# Patient Record
Sex: MALE | Race: WHITE | NOT HISPANIC OR LATINO | Employment: FULL TIME | URBAN - METROPOLITAN AREA
[De-identification: names, ages, dates, MRNs, and addresses within clinical notes are randomized per-mention and may not be internally consistent; named-entity substitution may affect disease eponyms.]

---

## 2017-01-11 ENCOUNTER — TELEPHONE (OUTPATIENT)
Dept: FAMILY MEDICINE | Facility: CLINIC | Age: 32
End: 2017-01-11

## 2017-01-11 DIAGNOSIS — E66.9 OBESITY (BMI 30-39.9): Primary | ICD-10-CM

## 2017-01-11 DIAGNOSIS — G47.33 OBSTRUCTIVE SLEEP APNEA: ICD-10-CM

## 2017-05-25 ENCOUNTER — TELEPHONE (OUTPATIENT)
Dept: FAMILY MEDICINE | Facility: CLINIC | Age: 32
End: 2017-05-25

## 2017-05-25 NOTE — TELEPHONE ENCOUNTER
I last saw him in January and ordered further sleep study (CPAP) along with labs which he has not had done. I tried reaching this morning and cannot reach. Message left. Can we please continue calling and document contact. Would like for him to have CPAP and labs and he also needs to establish care with Dr. Clemons.

## 2017-05-25 NOTE — TELEPHONE ENCOUNTER
Spoke to patient. Patient states he is busy at  The time that he will call back. Also states if we don't hear back from him to call him .

## 2017-05-31 NOTE — TELEPHONE ENCOUNTER
Spoke to pt wife and she states that he has the sleep study done a few months ago ans she is not sure why you did not receive results. States he actually went twice. They sent him home with a rx for cpap machine and they sent rx to an online cpap company. States pt has not had labs done due to him being afraid of needles. States she will try her best to get pt in to have those labs done. And she will discuss with pt to call and schedule appt to est care with shelbie.

## 2018-01-30 ENCOUNTER — CLINICAL SUPPORT (OUTPATIENT)
Dept: OCCUPATIONAL MEDICINE | Facility: CLINIC | Age: 33
End: 2018-01-30

## 2018-01-30 DIAGNOSIS — Z02.1 ENCOUNTER FOR PRE-EMPLOYMENT HEALTH SCREENING EXAMINATION: Primary | ICD-10-CM

## 2018-01-30 PROCEDURE — 86580 TB INTRADERMAL TEST: CPT | Mod: S$GLB,,,

## 2018-02-01 ENCOUNTER — CLINICAL SUPPORT (OUTPATIENT)
Dept: OCCUPATIONAL MEDICINE | Facility: CLINIC | Age: 33
End: 2018-02-01

## 2018-02-01 LAB
TB INDURATION - 48 HR READ: 0 MM
TB INDURATION - 72 HR READ: NORMAL MM
TB SKIN TEST - 48 HR READ: NEGATIVE
TB SKIN TEST - 72 HR READ: NORMAL

## 2018-11-26 ENCOUNTER — OCCUPATIONAL HEALTH (OUTPATIENT)
Dept: URGENT CARE | Facility: CLINIC | Age: 33
End: 2018-11-26

## 2018-11-26 DIAGNOSIS — Z02.83 ENCOUNTER FOR DRUG SCREENING: Primary | ICD-10-CM

## 2018-11-26 PROCEDURE — 80305 DRUG TEST PRSMV DIR OPT OBS: CPT | Mod: S$GLB,,, | Performed by: FAMILY MEDICINE

## 2019-07-30 ENCOUNTER — NURSE TRIAGE (OUTPATIENT)
Dept: ADMINISTRATIVE | Facility: CLINIC | Age: 34
End: 2019-07-30

## 2019-07-30 NOTE — TELEPHONE ENCOUNTER
Reason for Disposition   [1] Fever AND [2] no signs of serious infection or localizing symptoms (all other triage questions negative)    Protocols used: FEVER-A-AH    Pt states he started running a fever last night and it went away but returned today, he states it was 102.6 at one point and he has been taking tylenol and ibuprofen and it has dropped, he wanted to know when he should seek medical attention, I advised him of protocol recommendations, caller understands, advised him to call back with any needs or concerns, caller agreed

## 2019-07-31 ENCOUNTER — HOSPITAL ENCOUNTER (OUTPATIENT)
Dept: RADIOLOGY | Facility: HOSPITAL | Age: 34
Discharge: HOME | End: 2019-07-31
Attending: NURSE PRACTITIONER
Payer: COMMERCIAL

## 2019-07-31 ENCOUNTER — OFFICE VISIT (OUTPATIENT)
Dept: FAMILY MEDICINE | Facility: CLINIC | Age: 34
End: 2019-07-31
Payer: COMMERCIAL

## 2019-07-31 VITALS
BODY MASS INDEX: 38.83 KG/M2 | HEIGHT: 67 IN | SYSTOLIC BLOOD PRESSURE: 134 MMHG | DIASTOLIC BLOOD PRESSURE: 70 MMHG | HEART RATE: 121 BPM | OXYGEN SATURATION: 98 % | TEMPERATURE: 101 F | WEIGHT: 247.38 LBS

## 2019-07-31 DIAGNOSIS — R00.0 TACHYCARDIA WITH HEART RATE 121-140 BEATS PER MINUTE: ICD-10-CM

## 2019-07-31 DIAGNOSIS — R31.29 OTHER MICROSCOPIC HEMATURIA: ICD-10-CM

## 2019-07-31 DIAGNOSIS — J35.1 ENLARGED TONSILS: ICD-10-CM

## 2019-07-31 DIAGNOSIS — R50.9 FEVER, UNSPECIFIED FEVER CAUSE: Primary | ICD-10-CM

## 2019-07-31 DIAGNOSIS — R61 DIAPHORESIS: ICD-10-CM

## 2019-07-31 DIAGNOSIS — R50.9 CHILLS WITH FEVER: ICD-10-CM

## 2019-07-31 DIAGNOSIS — R50.9 FEVER, UNSPECIFIED FEVER CAUSE: ICD-10-CM

## 2019-07-31 LAB
BILIRUB SERPL-MCNC: ABNORMAL MG/DL
BILIRUB UR QL STRIP: NEGATIVE
BLOOD URINE, POC: ABNORMAL
CLARITY UR: CLEAR
COLOR UR: YELLOW
COLOR, POC UA: YELLOW
CTP QC/QA: YES
GLUCOSE UR QL STRIP: NEGATIVE
GLUCOSE UR QL STRIP: NORMAL
HGB UR QL STRIP: ABNORMAL
INFLUENZA A, MOLECULAR: NEGATIVE
INFLUENZA B, MOLECULAR: NEGATIVE
KETONES UR QL STRIP: ABNORMAL
KETONES UR QL STRIP: NEGATIVE
LEUKOCYTE ESTERASE UR QL STRIP: NEGATIVE
LEUKOCYTE ESTERASE URINE, POC: ABNORMAL
NITRITE UR QL STRIP: NEGATIVE
NITRITE, POC UA: ABNORMAL
PH UR STRIP: 6 [PH] (ref 5–8)
PH, POC UA: 5
PROT UR QL STRIP: NEGATIVE
PROTEIN, POC: ABNORMAL
S PYO RRNA THROAT QL PROBE: NEGATIVE
SP GR UR STRIP: 1.01 (ref 1–1.03)
SPECIFIC GRAVITY, POC UA: 1
SPECIMEN SOURCE: NORMAL
URN SPEC COLLECT METH UR: ABNORMAL
UROBILINOGEN, POC UA: NORMAL

## 2019-07-31 PROCEDURE — 81001 URINALYSIS AUTO W/SCOPE: CPT | Mod: S$GLB,,, | Performed by: NURSE PRACTITIONER

## 2019-07-31 PROCEDURE — 81003 URINALYSIS AUTO W/O SCOPE: CPT | Mod: PO

## 2019-07-31 PROCEDURE — 87070 CULTURE OTHR SPECIMN AEROBIC: CPT

## 2019-07-31 PROCEDURE — 99214 PR OFFICE/OUTPT VISIT, EST, LEVL IV, 30-39 MIN: ICD-10-PCS | Mod: 25,S$GLB,, | Performed by: NURSE PRACTITIONER

## 2019-07-31 PROCEDURE — 99999 PR PBB SHADOW E&M-EST. PATIENT-LVL IV: ICD-10-PCS | Mod: PBBFAC,,, | Performed by: NURSE PRACTITIONER

## 2019-07-31 PROCEDURE — 3008F BODY MASS INDEX DOCD: CPT | Mod: CPTII,S$GLB,, | Performed by: NURSE PRACTITIONER

## 2019-07-31 PROCEDURE — 81001 POCT URINALYSIS, DIPSTICK OR TABLET REAGENT, AUTOMATED, WITH MICROSCOP: ICD-10-PCS | Mod: S$GLB,,, | Performed by: NURSE PRACTITIONER

## 2019-07-31 PROCEDURE — 71046 X-RAY EXAM CHEST 2 VIEWS: CPT | Mod: TC,FY,PO

## 2019-07-31 PROCEDURE — 3008F PR BODY MASS INDEX (BMI) DOCUMENTED: ICD-10-PCS | Mod: CPTII,S$GLB,, | Performed by: NURSE PRACTITIONER

## 2019-07-31 PROCEDURE — 71046 X-RAY EXAM CHEST 2 VIEWS: CPT | Mod: 26,,, | Performed by: RADIOLOGY

## 2019-07-31 PROCEDURE — 87502 INFLUENZA DNA AMP PROBE: CPT | Mod: PO

## 2019-07-31 PROCEDURE — 99999 PR PBB SHADOW E&M-EST. PATIENT-LVL IV: CPT | Mod: PBBFAC,,, | Performed by: NURSE PRACTITIONER

## 2019-07-31 PROCEDURE — 87880 STREP A ASSAY W/OPTIC: CPT | Mod: QW,S$GLB,, | Performed by: NURSE PRACTITIONER

## 2019-07-31 PROCEDURE — 71046 XR CHEST PA AND LATERAL: ICD-10-PCS | Mod: 26,,, | Performed by: RADIOLOGY

## 2019-07-31 PROCEDURE — 99214 OFFICE O/P EST MOD 30 MIN: CPT | Mod: 25,S$GLB,, | Performed by: NURSE PRACTITIONER

## 2019-07-31 PROCEDURE — 87880 POCT RAPID STREP A: ICD-10-PCS | Mod: QW,S$GLB,, | Performed by: NURSE PRACTITIONER

## 2019-07-31 RX ORDER — ACETAMINOPHEN 500 MG
1000 TABLET ORAL ONCE
Status: COMPLETED | OUTPATIENT
Start: 2019-07-31 | End: 2019-07-31

## 2019-07-31 RX ADMIN — Medication 1000 MG: at 04:07

## 2019-07-31 NOTE — PROGRESS NOTES
Subjective:       Patient ID: Jony Reddy is a 33 y.o. male.    Chief Complaint: Fever  He was last seen in primary care by me on 12/29/2016.  HPI   He states started having temperature 2 days ago, Temp was 99 at the beginning. Took 3 ibuprofen and on next morning felt bad and took 3 more ibuprofen but did not take temp. Tuesday at 1:30 he went home and temp was 102.5 and upwards of 103 and last night 103.5  His last dose of medication was tylenol 2 tabs at 11:30.  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  Vitals:    07/31/19 1621   BP:    Pulse:    Temp: (!) 100.9 °F (38.3 °C)     BP Readings from Last 3 Encounters:   07/31/19 134/70   04/18/18 (!) 144/78   12/29/16 (!) 156/80     Pulse Readings from Last 3 Encounters:   07/31/19 (!) 121   04/18/18 96   12/29/16 92       Review of Systems   Constitutional: Positive for chills, diaphoresis and fever.   HENT: Negative for sore throat.         Sinus stopped up   Skin: Negative for rash.       Objective:      Physical Exam   Constitutional: He is oriented to person, place, and time. He appears well-developed and well-nourished. He is cooperative. He appears ill.   HENT:   Head: Normocephalic and atraumatic.   Right Ear: Hearing, tympanic membrane, external ear and ear canal normal.   Left Ear: Hearing, tympanic membrane, external ear and ear canal normal.   Nose: Nose normal.   Mouth/Throat: Uvula is midline, oropharynx is clear and moist and mucous membranes are normal. Tonsils are 3+ on the right. Tonsils are 3+ on the left. No tonsillar exudate.   Eyes: Lids are normal.   Neck: Trachea normal, normal range of motion, full passive range of motion without pain and phonation normal. Neck supple.   Cardiovascular: Regular rhythm. Tachycardia present.    Pulmonary/Chest: Effort normal and breath sounds normal.   Abdominal: Soft. Normal appearance and bowel sounds are normal. He exhibits no ascites. There is no splenomegaly. There is no tenderness.   Obese abdomen   Musculoskeletal: Normal range of motion.   Lymphadenopathy:        Head (right side): No submental, no submandibular, no tonsillar, no preauricular, no posterior auricular and no occipital adenopathy present.        Head (left side): No submental, no submandibular, no tonsillar, no preauricular, no posterior auricular and no occipital adenopathy present.     He has no cervical adenopathy.   Neurological: He is alert and oriented to person, place, and time. He has normal strength. No cranial nerve deficit. He displays a negative Romberg sign. He displays no seizure activity.   Skin: Skin is warm and intact. No rash noted. He is diaphoretic.   Psychiatric: His speech is normal and behavior is normal. Judgment and thought content normal. His mood appears anxious. Cognition and memory are normal.   Nursing note and vitals reviewed.      Assessment & Plan:       Fever, unspecified fever cause  -     POCT urinalysis, dipstick or tablet reag  -     POCT Rapid Strep A  -     X-Ray Chest PA And Lateral; Future; Expected date: 07/31/2019  -     CBC auto differential; Future; Expected date: 07/31/2019  -     Comprehensive metabolic panel; Future; Expected date: 07/31/2019  -     Throat culture  -     POCT Influenza A/B Molecular  -     URINALYSIS  -     Influenza A & B by Molecular  -     acetaminophen tablet 1,000 mg    Chills with fever  -     POCT urinalysis, dipstick or tablet reag  -     POCT Rapid Strep A  -     X-Ray Chest PA And Lateral; Future; Expected date: 07/31/2019  -     CBC auto differential; Future; Expected date: 07/31/2019  -     Comprehensive metabolic panel; Future; Expected date: 07/31/2019  -     Throat culture  -     POCT Influenza A/B Molecular  -     URINALYSIS  -     Influenza A & B by  Molecular    Tachycardia with heart rate 121-140 beats per minute  -     CBC auto differential; Future; Expected date: 07/31/2019  -     Comprehensive metabolic panel; Future; Expected date: 07/31/2019  -     POCT Influenza A/B Molecular  -     URINALYSIS  -     Influenza A & B by Molecular    Enlarged tonsils  -     Throat culture    Diaphoresis  -     X-Ray Chest PA And Lateral; Future; Expected date: 07/31/2019  -     Comprehensive metabolic panel; Future; Expected date: 07/31/2019  -     POCT Influenza A/B Molecular    Other microscopic hematuria    Rotate motrin and tylenol every 4 hours  TO emergency if temp over 103, stiff neck, rash, worsening symptoms  I have discussed this case in detail with one of the collaborating physicians in the office and differential of viral cause cannot be ruled out. I will follow him in clinic again on Friday. I spent more than 1 hour face-to-face with more than 50% with coordination of care and explaining in great detail need to go to ED if temperature greater than 103 or worsening symptoms.        Follow up in about 2 days (around 8/2/2019), or if symptoms worsen or fail to improve.

## 2019-07-31 NOTE — PATIENT INSTRUCTIONS
Rotate motrin and tylenol every 4 hours  TO emergency if temp over 103, stiff neck, rash, worsening symptoms

## 2019-07-31 NOTE — LETTER
July 31, 2019      [facility]  [address] [facility] [address] [place] [address]  Phone: [phone]  Fax: [phone]       Patient: Jony Reddy   YOB: 1985  Date of Visit: 07/31/2019    To Whom It May Concern:    Sheron Reddy  was at [facility] on 07/31/2019. He may return to work/school on 08/05/2019 with no restrictions. If you have any questions or concerns, or if I can be of further assistance, please do not hesitate to contact me.    Sincerely,    Yaa Prince DNP, APRN

## 2019-08-01 ENCOUNTER — TELEPHONE (OUTPATIENT)
Dept: FAMILY MEDICINE | Facility: CLINIC | Age: 34
End: 2019-08-01

## 2019-08-01 NOTE — TELEPHONE ENCOUNTER
Spoke with patient via telephone. States temp spiked to 103 last night but went down immediately to 99 after dosage of tylenol. Sore throat today but no rash or other changes. I have advised him to keep appointment tomorrow and to go to ED tonight if symptoms worsen.

## 2019-08-02 ENCOUNTER — OFFICE VISIT (OUTPATIENT)
Dept: FAMILY MEDICINE | Facility: CLINIC | Age: 34
End: 2019-08-02
Payer: COMMERCIAL

## 2019-08-02 VITALS
OXYGEN SATURATION: 98 % | BODY MASS INDEX: 38.47 KG/M2 | DIASTOLIC BLOOD PRESSURE: 86 MMHG | TEMPERATURE: 98 F | WEIGHT: 245.13 LBS | SYSTOLIC BLOOD PRESSURE: 122 MMHG | HEIGHT: 67 IN | HEART RATE: 81 BPM

## 2019-08-02 DIAGNOSIS — J02.8 BACTERIAL PHARYNGITIS: Primary | ICD-10-CM

## 2019-08-02 DIAGNOSIS — R03.0 ELEVATED BLOOD PRESSURE READING WITHOUT DIAGNOSIS OF HYPERTENSION: ICD-10-CM

## 2019-08-02 DIAGNOSIS — B96.89 BACTERIAL PHARYNGITIS: Primary | ICD-10-CM

## 2019-08-02 PROCEDURE — 3008F PR BODY MASS INDEX (BMI) DOCUMENTED: ICD-10-PCS | Mod: CPTII,S$GLB,, | Performed by: NURSE PRACTITIONER

## 2019-08-02 PROCEDURE — 3008F BODY MASS INDEX DOCD: CPT | Mod: CPTII,S$GLB,, | Performed by: NURSE PRACTITIONER

## 2019-08-02 PROCEDURE — 99214 PR OFFICE/OUTPT VISIT, EST, LEVL IV, 30-39 MIN: ICD-10-PCS | Mod: S$GLB,,, | Performed by: NURSE PRACTITIONER

## 2019-08-02 PROCEDURE — 99999 PR PBB SHADOW E&M-EST. PATIENT-LVL IV: ICD-10-PCS | Mod: PBBFAC,,, | Performed by: NURSE PRACTITIONER

## 2019-08-02 PROCEDURE — 99214 OFFICE O/P EST MOD 30 MIN: CPT | Mod: S$GLB,,, | Performed by: NURSE PRACTITIONER

## 2019-08-02 PROCEDURE — 99999 PR PBB SHADOW E&M-EST. PATIENT-LVL IV: CPT | Mod: PBBFAC,,, | Performed by: NURSE PRACTITIONER

## 2019-08-02 RX ORDER — ACETAMINOPHEN 325 MG/1
325 TABLET ORAL EVERY 6 HOURS PRN
COMMUNITY
End: 2023-11-14 | Stop reason: CLARIF

## 2019-08-02 RX ORDER — IBUPROFEN 200 MG
600 TABLET ORAL 2 TIMES DAILY PRN
Status: ON HOLD | COMMUNITY
End: 2023-11-15 | Stop reason: HOSPADM

## 2019-08-02 RX ORDER — AZITHROMYCIN 250 MG/1
TABLET, FILM COATED ORAL
Qty: 6 TABLET | Refills: 0 | Status: SHIPPED | OUTPATIENT
Start: 2019-08-02 | End: 2019-08-07

## 2019-08-02 NOTE — PATIENT INSTRUCTIONS

## 2019-08-02 NOTE — PROGRESS NOTES
Subjective:       Patient ID: Jony Reddy is a 33 y.o. male.    Chief Complaint: Follow-up (1 month)  I last saw him on 07/31/2019.  HPI   He is here today to follow up with persistent fever. His fever has subsided since yesterday but he does complain of sore throat today.  Vitals:    08/02/19 0856   BP: 122/86   Pulse: 81   Temp: 98.1 °F (36.7 °C)     BP Readings from Last 3 Encounters:   08/02/19 122/86   07/31/19 134/70   04/18/18 (!) 144/78     Pulse Readings from Last 3 Encounters:   08/02/19 81   07/31/19 (!) 121   04/18/18 96     Review of Systems    Took ibuprofen 600 mg at 4am today. Last temp yesterday was 99.  Objective:      Physical Exam   Constitutional: He is oriented to person, place, and time. Vital signs are normal. He appears well-developed and well-nourished. He is cooperative. He appears ill.   HENT:   Head: Normocephalic and atraumatic.   Right Ear: Hearing, tympanic membrane, external ear and ear canal normal.   Left Ear: Hearing, tympanic membrane, external ear and ear canal normal.   Nose: Nose normal.   Mouth/Throat: Uvula is midline, oropharynx is clear and moist and mucous membranes are normal. Tonsils are 2+ on the right. Tonsils are 2+ on the left.       Beefy red tonsils and uvula, complains of severely sore throat.   Eyes: Lids are normal.   Neck: Trachea normal, normal range of motion, full passive range of motion without pain and phonation normal. Neck supple.   Cardiovascular: Normal rate, regular rhythm and normal heart sounds.   Pulmonary/Chest: Effort normal and breath sounds normal.   Abdominal: Soft. Bowel sounds are normal.   Musculoskeletal: Normal range of motion.   Lymphadenopathy:        Head (right side): No submental, no submandibular, no tonsillar, no preauricular, no posterior auricular and no occipital adenopathy present.        Head (left side): No submental, no submandibular, no tonsillar, no preauricular, no posterior auricular and no occipital  adenopathy present.     He has no cervical adenopathy.   Neurological: He is alert and oriented to person, place, and time.   Skin: Skin is warm, dry and intact. No rash noted.   Psychiatric: He has a normal mood and affect. His speech is normal and behavior is normal. Judgment and thought content normal. Cognition and memory are normal.   Nursing note and vitals reviewed.      Assessment & Plan:       Bacterial pharyngitis  -     azithromycin (Z-RACHNA) 250 MG tablet; Take 2 tablets by mouth on day 1 then take 1 tablet by mouth on days 2-5  Dispense: 6 tablet; Refill: 0    Elevated blood pressure reading without diagnosis of hypertension  -     Lipid panel; Future; Expected date: 08/02/2019  -     Comprehensive metabolic panel; Future; Expected date: 08/02/2019  -     CBC auto differential; Future; Expected date: 08/02/2019  -     TSH; Future; Expected date: 08/02/2019      Medication List with Changes/Refills   New Medications    AZITHROMYCIN (Z-RACHNA) 250 MG TABLET    Take 2 tablets by mouth on day 1 then take 1 tablet by mouth on days 2-5   Current Medications    ACETAMINOPHEN (TYLENOL) 325 MG TABLET    Take 325 mg by mouth every 6 (six) hours as needed for Pain.    IBUPROFEN (ADVIL,MOTRIN) 200 MG TABLET    Take 200 mg by mouth every 6 (six) hours as needed for Pain.         Follow up if symptoms worsen or fail to improve.

## 2019-08-03 LAB — BACTERIA THROAT CULT: NORMAL

## 2019-10-18 ENCOUNTER — PATIENT OUTREACH (OUTPATIENT)
Dept: ADMINISTRATIVE | Facility: HOSPITAL | Age: 34
End: 2019-10-18

## 2019-10-18 NOTE — PROGRESS NOTES
Health Maintenance Due   Topic Date Due    Lipid Panel  1984    TETANUS VACCINE  12/30/2002    Influenza Vaccine (1) 09/01/2019     Chart review completed 10/18/2019

## 2019-11-09 ENCOUNTER — TELEPHONE (OUTPATIENT)
Dept: FAMILY MEDICINE | Facility: CLINIC | Age: 34
End: 2019-11-09

## 2021-05-04 ENCOUNTER — PATIENT MESSAGE (OUTPATIENT)
Dept: RESEARCH | Facility: HOSPITAL | Age: 36
End: 2021-05-04

## 2023-11-14 PROBLEM — R07.9 CHEST PAIN: Status: ACTIVE | Noted: 2023-11-14

## 2023-11-14 PROBLEM — I21.4 NSTEMI (NON-ST ELEVATED MYOCARDIAL INFARCTION): Status: ACTIVE | Noted: 2023-11-14

## 2023-11-16 ENCOUNTER — NURSE TRIAGE (OUTPATIENT)
Dept: ADMINISTRATIVE | Facility: CLINIC | Age: 38
End: 2023-11-16
Payer: COMMERCIAL

## 2023-11-16 ENCOUNTER — TELEPHONE (OUTPATIENT)
Dept: CARDIOLOGY | Facility: CLINIC | Age: 38
End: 2023-11-16
Payer: COMMERCIAL

## 2023-11-16 NOTE — TELEPHONE ENCOUNTER
Spoke with pt who states that since he took his medication at 11 am he has been having a rash/hives all over his body. Pt reports no trouble breathing or swallowing. Advised pt to go to urgent care or ED now and not take the medication until advised by a MD. Joce SANCHEZ

## 2023-11-16 NOTE — TELEPHONE ENCOUNTER
----- Message from Krista Jade sent at 11/16/2023  2:27 PM CST -----  Regarding: Allergic reaction  Type:  Needs Medical Advice    Who Called: Pt    Symptoms (please be specific): Rash on neck/ head/ hair ( all over body)     How long has patient had these symptoms:  few hrs    Pharmacy name and phone #:    [id number] - [facility] [address] [address] AT [facility] [address] [facility] [address]  [address] [address] [address]  Phone: [phone] Fax: [phone]      Would the patient rather a call back or a response via [facility]? Call back    Best Call Back Number: [phone]    Additional Information: Pt sts he was in the hosp and was give some meds and think he is having a allergic reaction clopidogreL (PLAVIX) 75 mg tablet, atorvastatin (LIPITOR) 40 MG tablet and aspirin (ECOTRIN) 81 MG EC tablet. Please advise -------------------thank you

## 2023-11-16 NOTE — TELEPHONE ENCOUNTER
Spoke with pt who reports that he was discharged from hospital on ASA, plavix, and atorvastatin. States that he took meds at 10 AM and noticed throughout the day he began to develop bumps all over body, that are itchy. Pt advised to be seen in ED. Verbalized understanding.      Reason for Disposition   Widespread hives, itching, or facial swelling and onset < 2 hours of exposure to high-risk allergen (e.g., 1st dose of antibiotic, nuts, sting)    Additional Information   Negative: Difficulty breathing or wheezing now   Negative: Rapid onset of swollen tongue   Negative: Rapid onset of hoarseness or cough   Negative: Very weak (can't stand)   Negative: Difficult to awaken or acting confused (e.g., disoriented, slurred speech)   Negative: Life-threatening reaction (anaphylaxis) in the past to similar substance (e.g., food, insect bite/sting, chemical, etc.) and < 2 hours since exposure   Negative: Sounds like a life-threatening emergency to the triager   Negative: Swollen tongue    Protocols used: Hives-A-OH

## 2023-11-29 ENCOUNTER — OFFICE VISIT (OUTPATIENT)
Dept: CARDIOLOGY | Facility: CLINIC | Age: 38
End: 2023-11-29
Payer: COMMERCIAL

## 2023-11-29 ENCOUNTER — PATIENT MESSAGE (OUTPATIENT)
Dept: CARDIOLOGY | Facility: CLINIC | Age: 38
End: 2023-11-29

## 2023-11-29 VITALS
WEIGHT: 219.13 LBS | HEIGHT: 68 IN | BODY MASS INDEX: 33.21 KG/M2 | SYSTOLIC BLOOD PRESSURE: 132 MMHG | DIASTOLIC BLOOD PRESSURE: 83 MMHG | HEART RATE: 86 BPM

## 2023-11-29 DIAGNOSIS — I21.4 NSTEMI (NON-ST ELEVATED MYOCARDIAL INFARCTION): Primary | ICD-10-CM

## 2023-11-29 DIAGNOSIS — R03.0 ELEVATED BLOOD PRESSURE READING WITHOUT DIAGNOSIS OF HYPERTENSION: ICD-10-CM

## 2023-11-29 PROCEDURE — 3044F PR MOST RECENT HEMOGLOBIN A1C LEVEL <7.0%: ICD-10-PCS | Mod: CPTII,S$GLB,,

## 2023-11-29 PROCEDURE — 99999 PR PBB SHADOW E&M-EST. PATIENT-LVL III: ICD-10-PCS | Mod: PBBFAC,,,

## 2023-11-29 PROCEDURE — 3008F PR BODY MASS INDEX (BMI) DOCUMENTED: ICD-10-PCS | Mod: CPTII,S$GLB,,

## 2023-11-29 PROCEDURE — 99999 PR PBB SHADOW E&M-EST. PATIENT-LVL III: CPT | Mod: PBBFAC,,,

## 2023-11-29 PROCEDURE — 1111F PR DISCHARGE MEDS RECONCILED W/ CURRENT OUTPATIENT MED LIST: ICD-10-PCS | Mod: CPTII,S$GLB,,

## 2023-11-29 PROCEDURE — 1159F PR MEDICATION LIST DOCUMENTED IN MEDICAL RECORD: ICD-10-PCS | Mod: CPTII,S$GLB,,

## 2023-11-29 PROCEDURE — 3079F DIAST BP 80-89 MM HG: CPT | Mod: CPTII,S$GLB,,

## 2023-11-29 PROCEDURE — 1111F DSCHRG MED/CURRENT MED MERGE: CPT | Mod: CPTII,S$GLB,,

## 2023-11-29 PROCEDURE — 3044F HG A1C LEVEL LT 7.0%: CPT | Mod: CPTII,S$GLB,,

## 2023-11-29 PROCEDURE — 3075F PR MOST RECENT SYSTOLIC BLOOD PRESS GE 130-139MM HG: ICD-10-PCS | Mod: CPTII,S$GLB,,

## 2023-11-29 PROCEDURE — 3075F SYST BP GE 130 - 139MM HG: CPT | Mod: CPTII,S$GLB,,

## 2023-11-29 PROCEDURE — 1159F MED LIST DOCD IN RCRD: CPT | Mod: CPTII,S$GLB,,

## 2023-11-29 PROCEDURE — 99214 OFFICE O/P EST MOD 30 MIN: CPT | Mod: S$GLB,,,

## 2023-11-29 PROCEDURE — 3079F PR MOST RECENT DIASTOLIC BLOOD PRESSURE 80-89 MM HG: ICD-10-PCS | Mod: CPTII,S$GLB,,

## 2023-11-29 PROCEDURE — 99214 PR OFFICE/OUTPT VISIT, EST, LEVL IV, 30-39 MIN: ICD-10-PCS | Mod: S$GLB,,,

## 2023-11-29 PROCEDURE — 3008F BODY MASS INDEX DOCD: CPT | Mod: CPTII,S$GLB,,

## 2023-11-29 NOTE — PROGRESS NOTES
Subjective:    Patient ID:  Jony Reddy is a 37 y.o. male patient here for evaluation No chief complaint on file.    History of Present Illness:     Mr. Reddy is a 37 year old M who will establish care with Dr. Pearson here today for a hospital follow up. Admitted with chest pain after viral infection and found to have elevated troponin. LHC revealed a distal circ occlusion too small for PCI. EF preserved. Does not have anymore chest pains. Had an allergic reaction to lipitor so has discontinued. Doing well but is concerned that he will have another heart attack.           Most Recent Echocardiogram Results  Results for orders placed during the hospital encounter of 11/14/23    Echo Saline Bubble? No    Interpretation Summary    Left Ventricle: The left ventricle is normal in size. Normal wall thickness. Normal wall motion. There is normal systolic function. Ejection fraction by visual approximation is 60%. There is normal diastolic function.    Right Ventricle: Normal right ventricular cavity size. Wall thickness is normal. Right ventricle wall motion  is normal. Systolic function is normal.    Aortic Valve: The aortic valve is a trileaflet valve.    IVC/SVC: Normal venous pressure at 3 mmHg.      Most Recent Nuclear Stress Test Results  No results found for this or any previous visit.      Most Recent Cardiac PET Stress Test Results  No results found for this or any previous visit.      Most Recent Cardiovascular Angiogram results  Results for orders placed during the hospital encounter of 11/14/23    Cardiac catheterization    Conclusion    Single-vessel coronary artery disease as described in the text, with a total occlusion of a very distal branch of the circumflex, no PCI was done    The left ventricular systolic function was normal.    The left ventricular end diastolic pressure was elevated.    The procedure log was documented by Documenter: Trey Lima RT and verified by Rob OWEN  MD Tin.    Date: 11/14/2023  Time: 1:00 PM    Intervention:  Unable to advance wire into the did very distal portion of the distal left circumflex.  Thus, no PCI attempted      Other Most Recent Cardiology Results  Results for orders placed during the hospital encounter of 11/16/23    CARDIAC MONITORING STRIPS      REVIEW OF SYSTEMS: As noted in HPI   CARDIOVASCULAR: No recent chest pain, palpitations, arm/neck/jaw pain, or edema.  RESPIRATORY: No recent fever, cough, SOB.  : No blood in the urine  GI: No reflux, nausea, vomiting, or blood in stool.   MUSCULOSKELETAL: No falls.   NEURO: No headaches, syncope, or dizziness.  EYES: No sudden changes in vision.     Past Medical History:   Diagnosis Date    White coat syndrome with high blood pressure but without hypertension      Past Surgical History:   Procedure Laterality Date    CORONARY ANGIOGRAPHY  11/14/2023    Procedure: Coronary angiogram study;  Surgeon: Rob Castle MD;  Location: [facility] CATH;  Service: Cardiology;;    LEFT HEART CATHETERIZATION  11/14/2023    Procedure: Left heart cath ER EXAM 4;  Surgeon: Rob Castle MD;  Location: [facility] CATH;  Service: Cardiology;;     Social History     Tobacco Use    Smoking status: Former    Smokeless tobacco: Never         Objective      Vitals:    11/29/23 1413   BP: 132/83   Pulse: 86       LAST EKG  Results for orders placed or performed in visit on 11/16/23   EKG 12-lead    Collection Time: 11/16/23  3:26 PM    Narrative    Test Reason : T78.40XA    Vent. Rate : 090 BPM     Atrial Rate : 090 BPM     P-R Int : 142 ms          QRS Dur : 084 ms      QT Int : 342 ms       P-R-T Axes : 064 -05 -25 degrees     QTc Int : 418 ms    Normal sinus rhythm  Inferior infarct (cited on or before 14-NOV-2023)  T wave abnormality, consider lateral ischemia  Abnormal ECG  When compared with ECG of 14-NOV-2023 05:47,  ST no longer elevated in Anterior leads  T wave inversion now evident in Inferior  "leads  T wave inversion now evident in Anterior-lateral leads  Confirmed by Lili AGUILAR, Danny MICHAUD ([id number]) on 11/17/2023 2:46:16 PM    Referred By: [email or web address]   SELF           Confirmed By:Danny Pearson MD     LIPIDS - LAST 2   Lab Results   Component Value Date    CHOL 168 11/15/2023    HDL 30 (L) 11/15/2023    LDLCALC 108.0 11/15/2023    TRIG 150 11/15/2023    CHOLHDL 17.9 (L) 11/15/2023     CARDIAC PROFILE - LAST 2  Lab Results   Component Value Date    TROPONINI 2.210 (HH) 11/14/2023    TROPONINI 1.910 (HH) 11/14/2023      CBC - LAST 2  Lab Results   Component Value Date    WBC 9.14 11/14/2023    WBC 7.42 07/31/2019    RBC 5.40 11/14/2023    RBC 5.60 07/31/2019    HGB 15.7 11/14/2023    HGB 16.0 07/31/2019    HCT 47.2 11/14/2023    HCT 46.5 07/31/2019     11/14/2023     07/31/2019     No results found for: "LABPT", "INR", "APTT"  CHEMISTRY - LAST 2  Lab Results   Component Value Date     11/14/2023     10/03/2022    K 3.6 11/14/2023    K 4.0 10/03/2022     11/14/2023     07/31/2019    CO2 21 (L) 11/14/2023    CO2 26 10/03/2022    ANIONGAP 11 11/14/2023    ANIONGAP 11 10/03/2022    BUN 13 11/14/2023    BUN 12 10/03/2022    CREATININE 0.81 11/14/2023    CREATININE 0.91 10/03/2022     (H) 11/14/2023    GLU 97 07/31/2019    CALCIUM 8.8 11/14/2023    CALCIUM 10.5 (H) 10/03/2022    MG 2.1 11/14/2023    ALBUMIN 4.2 11/14/2023    ALBUMIN 4.7 10/03/2022    PROT 7.5 11/14/2023    PROT 8.0 (H) 10/03/2022    ALKPHOS 61 11/14/2023    ALKPHOS 52 10/03/2022    ALT 36 11/14/2023    ALT 74 (H) 10/03/2022    AST 37 11/14/2023    AST 48 (H) 10/03/2022    BILITOT 0.5 11/14/2023    BILITOT 0.5 10/03/2022      ENDOCRINE - LAST 2  Lab Results   Component Value Date    HGBA1C 5.0 11/14/2023    HGBA1C 5.1 11/16/2022    TSH 1.620 11/14/2023    TSH 1.170 04/18/2018        PHYSICAL EXAM  CONSTITUTIONAL: Well built, well nourished in no apparent distress  NECK: no carotid bruit, no JVD  LUNGS: " CTA  CHEST WALL: no tenderness  HEART: regular rate and rhythm, S1, S2 normal, no murmur, click, rub or gallop   ABDOMEN: soft, non-tender; bowel sounds normal; no masses,  no organomegaly  EXTREMITIES: Extremities normal, no edema, no calf tenderness noted  NEURO: AAO X 3    I HAVE REVIEWED :    The vital signs, most recent cardiac testing, and most recent pertinent non-cardiology provider notes.    Current Outpatient Medications   Medication Instructions    aspirin (ECOTRIN) 81 mg, Oral, Daily    atorvastatin (LIPITOR) 40 mg, Oral, Daily    azithromycin (Z-RACHNA) 500 mg, Oral, Once, On day 1    clopidogreL (PLAVIX) 75 mg, Oral, Daily    EPINEPHrine (EPIPEN) 0.3 mg, Intramuscular, As needed (PRN)    famotidine (PEPCID) 20 mg, Oral, 2 times daily    sildenafiL (VIAGRA) 25 mg, Oral, Daily PRN      Assessment & Plan     1. NSTEMI (non-ST elevated myocardial infarction)  Very distal circ not amendable to PCI   No more angina   Continue risk factor modification   Allergy to statin -- Try bempedoic acid   Continue asa plavix. D/c plavix after 3-6 months if side effects persist.     2. Elevated blood pressure reading without diagnosis of hypertension  BP ok today, continue               Follow up in about 6 months (around 5/29/2024).     Naomi Peters, PA-C [facility]   Office: [phone]

## 2023-12-15 ENCOUNTER — PATIENT MESSAGE (OUTPATIENT)
Dept: CARDIOLOGY | Facility: CLINIC | Age: 38
End: 2023-12-15
Payer: COMMERCIAL

## 2024-01-18 ENCOUNTER — PATIENT MESSAGE (OUTPATIENT)
Dept: CARDIOLOGY | Facility: CLINIC | Age: 39
End: 2024-01-18
Payer: COMMERCIAL

## 2024-01-18 DIAGNOSIS — I21.4 NSTEMI (NON-ST ELEVATED MYOCARDIAL INFARCTION): Primary | ICD-10-CM

## 2024-01-18 RX ORDER — CLOPIDOGREL BISULFATE 75 MG/1
75 TABLET ORAL DAILY
Qty: 90 TABLET | Refills: 3 | Status: SHIPPED | OUTPATIENT
Start: 2024-01-18 | End: 2024-05-29

## 2024-02-19 ENCOUNTER — PATIENT MESSAGE (OUTPATIENT)
Dept: CARDIOLOGY | Facility: CLINIC | Age: 39
End: 2024-02-19
Payer: COMMERCIAL

## 2024-02-19 PROBLEM — I21.4 NSTEMI (NON-ST ELEVATED MYOCARDIAL INFARCTION): Status: RESOLVED | Noted: 2023-11-14 | Resolved: 2024-02-19

## 2024-05-29 ENCOUNTER — OFFICE VISIT (OUTPATIENT)
Dept: CARDIOLOGY | Facility: CLINIC | Age: 39
End: 2024-05-29
Payer: COMMERCIAL

## 2024-05-29 VITALS
WEIGHT: 231.5 LBS | HEART RATE: 82 BPM | BODY MASS INDEX: 35.09 KG/M2 | DIASTOLIC BLOOD PRESSURE: 80 MMHG | SYSTOLIC BLOOD PRESSURE: 133 MMHG | HEIGHT: 68 IN

## 2024-05-29 DIAGNOSIS — R03.0 ELEVATED BLOOD PRESSURE READING WITHOUT DIAGNOSIS OF HYPERTENSION: ICD-10-CM

## 2024-05-29 DIAGNOSIS — I25.10 CORONARY ARTERY DISEASE INVOLVING NATIVE CORONARY ARTERY OF NATIVE HEART WITHOUT ANGINA PECTORIS: Primary | ICD-10-CM

## 2024-05-29 PROCEDURE — 99214 OFFICE O/P EST MOD 30 MIN: CPT | Mod: S$GLB,,, | Performed by: INTERNAL MEDICINE

## 2024-05-29 PROCEDURE — 3079F DIAST BP 80-89 MM HG: CPT | Mod: CPTII,S$GLB,, | Performed by: INTERNAL MEDICINE

## 2024-05-29 PROCEDURE — 3008F BODY MASS INDEX DOCD: CPT | Mod: CPTII,S$GLB,, | Performed by: INTERNAL MEDICINE

## 2024-05-29 PROCEDURE — 1159F MED LIST DOCD IN RCRD: CPT | Mod: CPTII,S$GLB,, | Performed by: INTERNAL MEDICINE

## 2024-05-29 PROCEDURE — 3075F SYST BP GE 130 - 139MM HG: CPT | Mod: CPTII,S$GLB,, | Performed by: INTERNAL MEDICINE

## 2024-05-29 PROCEDURE — 1160F RVW MEDS BY RX/DR IN RCRD: CPT | Mod: CPTII,S$GLB,, | Performed by: INTERNAL MEDICINE

## 2024-05-29 PROCEDURE — 99999 PR PBB SHADOW E&M-EST. PATIENT-LVL III: CPT | Mod: PBBFAC,,, | Performed by: INTERNAL MEDICINE

## 2024-05-29 NOTE — PROGRESS NOTES
Subjective:    Patient ID:  Jony Reddy is a 38 y.o. male who presents for follow-up of     [facility]  He comes for follow up with no major problems, no chest pain, no shortness of breath.  No cardiac issues at this time.  No cardiac issues at this time.    Blood pressure normal at home.    Only on aspirin.  Functional class 2    Review of Systems   Constitutional: Negative for decreased appetite, malaise/fatigue, weight gain and weight loss.   Cardiovascular:  Negative for chest pain, dyspnea on exertion, leg swelling, palpitations and syncope.   Respiratory:  Negative for cough and shortness of breath.    Gastrointestinal: Negative.    Neurological:  Negative for weakness.   All other systems reviewed and are negative.       Objective:      Physical Exam  Vitals and nursing note reviewed.   Constitutional:       Appearance: Normal appearance. He is well-developed.   HENT:      Head: Normocephalic.   Eyes:      Pupils: Pupils are equal, round, and reactive to light.   Neck:      Thyroid: No thyromegaly.      Vascular: No carotid bruit or JVD.   Cardiovascular:      Rate and Rhythm: Normal rate and regular rhythm.      Chest Wall: PMI is not displaced.      Pulses: Normal pulses and intact distal pulses.      Heart sounds: Normal heart sounds. No murmur heard.     No gallop.   Pulmonary:      Effort: Pulmonary effort is normal.      Breath sounds: Normal breath sounds.   Abdominal:      Palpations: Abdomen is soft. There is no mass.      Tenderness: There is no abdominal tenderness.   Musculoskeletal:         General: Normal range of motion.      Cervical back: Normal range of motion and neck supple.   Skin:     General: Skin is warm.   Neurological:      Mental Status: He is alert and oriented to person, place, and time.      Sensory: No sensory deficit.      Deep Tendon Reflexes: Reflexes are normal and symmetric.             Most Recent EKG Results  Results for orders placed or performed in visit on  11/16/23   EKG 12-lead    Collection Time: 11/16/23  3:26 PM    Narrative    Test Reason : T78.40XA    Vent. Rate : 090 BPM     Atrial Rate : 090 BPM     P-R Int : 142 ms          QRS Dur : 084 ms      QT Int : 342 ms       P-R-T Axes : 064 -05 -25 degrees     QTc Int : 418 ms    Normal sinus rhythm  Inferior infarct (cited on or before 14-NOV-2023)  T wave abnormality, consider lateral ischemia  Abnormal ECG  When compared with ECG of 14-NOV-2023 05:47,  ST no longer elevated in Anterior leads  T wave inversion now evident in Inferior leads  T wave inversion now evident in Anterior-lateral leads  Confirmed by Lili AGUILAR, Danny MICHAUD ([id number]) on 11/17/2023 2:46:16 PM    Referred By: [email or web address]   SELF           Confirmed By:Danny Pearson MD       Most Recent Echocardiogram Results  Results for orders placed during the hospital encounter of 11/14/23    Echo Saline Bubble? No    Interpretation Summary    Left Ventricle: The left ventricle is normal in size. Normal wall thickness. Normal wall motion. There is normal systolic function. Ejection fraction by visual approximation is 60%. There is normal diastolic function.    Right Ventricle: Normal right ventricular cavity size. Wall thickness is normal. Right ventricle wall motion  is normal. Systolic function is normal.    Aortic Valve: The aortic valve is a trileaflet valve.    IVC/SVC: Normal venous pressure at 3 mmHg.      Most Recent Nuclear Stress Test Results  No results found for this or any previous visit.      Most Recent Cardiac PET Stress Test Results  No results found for this or any previous visit.      Most Recent Cardiovascular Angiogram results  Results for orders placed during the hospital encounter of 11/14/23    Cardiac catheterization    Conclusion    Single-vessel coronary artery disease as described in the text, with a total occlusion of a very distal branch of the circumflex, no PCI was done    The left ventricular systolic function was normal.     The left ventricular end diastolic pressure was elevated.    The procedure log was documented by Documenter: Trey Lima RT and verified by Rob Castle MD.    Date: 11/14/2023  Time: 1:00 PM    Intervention:  Unable to advance wire into the did very distal portion of the distal left circumflex.  Thus, no PCI attempted      Other Most Recent Cardiology Results  Results for orders placed during the hospital encounter of 11/16/23    CARDIAC MONITORING STRIPS      Labs reviewed    Assessment:       1. Coronary artery disease involving native coronary artery of native heart without angina pectoris    2. Elevated blood pressure reading without diagnosis of hypertension         Plan:     Continue:  ASA  Regular exercise program  Weight loss  Low cholesterol diet     Follow-up in 1 year with a stress echoca

## 2025-05-27 DIAGNOSIS — R03.0 ELEVATED BLOOD PRESSURE READING WITHOUT DIAGNOSIS OF HYPERTENSION: Primary | ICD-10-CM

## 2025-05-27 DIAGNOSIS — R07.9 CHEST PAIN, UNSPECIFIED TYPE: ICD-10-CM

## 2025-05-27 DIAGNOSIS — I25.10 CORONARY ARTERY DISEASE INVOLVING NATIVE CORONARY ARTERY OF NATIVE HEART WITHOUT ANGINA PECTORIS: ICD-10-CM
